# Patient Record
Sex: MALE | Race: WHITE | NOT HISPANIC OR LATINO
[De-identification: names, ages, dates, MRNs, and addresses within clinical notes are randomized per-mention and may not be internally consistent; named-entity substitution may affect disease eponyms.]

---

## 2021-06-21 PROBLEM — Z00.00 ENCOUNTER FOR PREVENTIVE HEALTH EXAMINATION: Status: ACTIVE | Noted: 2021-06-21

## 2021-06-25 ENCOUNTER — APPOINTMENT (OUTPATIENT)
Dept: NEUROLOGY | Facility: CLINIC | Age: 66
End: 2021-06-25
Payer: COMMERCIAL

## 2021-06-25 VITALS
DIASTOLIC BLOOD PRESSURE: 85 MMHG | OXYGEN SATURATION: 97 % | TEMPERATURE: 97.8 F | HEIGHT: 68 IN | BODY MASS INDEX: 26.52 KG/M2 | HEART RATE: 77 BPM | WEIGHT: 175 LBS | SYSTOLIC BLOOD PRESSURE: 132 MMHG

## 2021-06-25 PROCEDURE — 99204 OFFICE O/P NEW MOD 45 MIN: CPT

## 2021-06-25 PROCEDURE — 99072 ADDL SUPL MATRL&STAF TM PHE: CPT

## 2021-06-26 ENCOUNTER — TRANSCRIPTION ENCOUNTER (OUTPATIENT)
Age: 66
End: 2021-06-26

## 2021-06-26 NOTE — PHYSICAL EXAM
[FreeTextEntry1] : Neuro Exam\par MS: AAOx3, follows commands, good comprehension, fund of knowledge appears intact, no aphasia, no dysarthria\par CN:  PERRL, EOMI, peripheral vision intact, v1-v3 intact, hearing intact, no facial asymmetry, tongue & uvula midline, shoulder shrug equal strength\par Motor:  dec rom of left shoulder with 4/5 strength of shoulders b/l , otherwise 5/5 no drift, no rigidity, no abnormal atrophy\par Sensory: Lt/PP intact\par Coord: no tremors\par DTR: 1+\par \par

## 2021-06-26 NOTE — HISTORY OF PRESENT ILLNESS
[FreeTextEntry1] : pain, numbness in arms and shoulders. \par tingling in fingers. \par \par Pt is 64 yo M with hx of lumbar stenosis presenting with pain/numbness and weakness of UE\par \par Pt seen in the office\par \par Pt with hx of radiating arm pain and numbness for some time. More recently, pt noted some weakness down his left arm with subsequent difficulty with fine motor tasks (opening jars, playing guitar).  Pt reports that he is active, biking often, but denies any falls, head trauma.  Symptom was not acute in onset, slowly worsened over time.\par \par Otherwise, no Ha, no blurry vision, no nausea\par \par On exam today, pt with no isolated left arm weakness (pt reports that it comes and goes), with residual dec rom in left shoulder (old)\par \par

## 2021-06-26 NOTE — ASSESSMENT
[FreeTextEntry1] : \par - suggest MRI c spine to evaluate for any cervical stenosis as a cause of proximal UE weakness\par - will consider emg\par - follow up after MRI

## 2021-06-26 NOTE — REVIEW OF SYSTEMS
[Recent Weight Loss (___ Lbs)] : recent [unfilled] ~Ulb weight loss [Numbness] : numbness [Difficulty Walking] : difficulty walking [Joint Pain] : joint pain [Limb Pain] : limb pain [Negative] : Integumentary [FreeTextEntry4] : tinnitus [FreeTextEntry8] : nighttime urination [FreeTextEntry9] : muscle aches

## 2021-08-04 ENCOUNTER — APPOINTMENT (OUTPATIENT)
Dept: NEUROLOGY | Facility: CLINIC | Age: 66
End: 2021-08-04
Payer: COMMERCIAL

## 2021-08-04 PROCEDURE — 95913 NRV CNDJ TEST 13/> STUDIES: CPT

## 2021-08-04 PROCEDURE — 99072 ADDL SUPL MATRL&STAF TM PHE: CPT

## 2021-08-04 PROCEDURE — 95886 MUSC TEST DONE W/N TEST COMP: CPT

## 2021-09-10 ENCOUNTER — APPOINTMENT (OUTPATIENT)
Dept: NEUROLOGY | Facility: CLINIC | Age: 66
End: 2021-09-10
Payer: COMMERCIAL

## 2021-09-10 VITALS
TEMPERATURE: 97.9 F | WEIGHT: 175 LBS | SYSTOLIC BLOOD PRESSURE: 115 MMHG | OXYGEN SATURATION: 97 % | HEART RATE: 63 BPM | DIASTOLIC BLOOD PRESSURE: 70 MMHG | BODY MASS INDEX: 26.61 KG/M2

## 2021-09-10 DIAGNOSIS — M54.12 RADICULOPATHY, CERVICAL REGION: ICD-10-CM

## 2021-09-10 DIAGNOSIS — R29.898 OTHER SYMPTOMS AND SIGNS INVOLVING THE MUSCULOSKELETAL SYSTEM: ICD-10-CM

## 2021-09-10 DIAGNOSIS — M54.2 CERVICALGIA: ICD-10-CM

## 2021-09-10 PROCEDURE — 99215 OFFICE O/P EST HI 40 MIN: CPT

## 2021-09-10 PROCEDURE — 99072 ADDL SUPL MATRL&STAF TM PHE: CPT

## 2021-09-10 NOTE — ASSESSMENT
[FreeTextEntry1] : \par Pt with some component of cervical radiculopathy. no emg finding of peripheral neuropathy, no stroke/mass on mri head\par \par - physical therapy referral (cervical radiculopaty, frozen shoulder).\par - if symptom worsens suggested ortho/spine eval\par - no sig pain currently, prn pain meds. will defer any neuropathic pain meds\par - follow up with me as needed\par

## 2021-09-10 NOTE — DATA REVIEWED
[de-identified] : MRI brain with and without intervenous contrast, 8/3/2021\par \par TECHNIQUE: 1.5 Marie magnet. Localizer images. Sagittal T1-weighted. Axial\par T1-weighted, T2-weighted, susceptibility weighted and diffusion. Sagittal\par FLAIR volume acquisition sequence was reconstructed in coronal and axial\par planes. Coronal T2-weighted. Sagittal T1 weighted volume acquisition sequence\par after contrast was reconstructed in axial and coronal planes. A separate set\par of coronal T1-weighted images was obtained after contrast.\par \par INTRAVENOUS CONTRAST: 7 mL gadobutrol.\par \par CLINICAL INFORMATION: Bilateral shoulder weakness (R29.898)\par \par COMPARISON: MRI 8/3/2009\par \par IMAGING FINDINGS: No focal high or low signal intensity lesion is demonstrated\par in the brain. There is no abnormal enhancement of brain or meninges. There is\par no hydrocephalus. There is no extra-axial collection.\par \par No neoplastic replacement of bone marrow is demonstrated.\par \par No orbital abnormality is demonstrated.\par \par Minimal pansinus mucosal thickening is developed since the previous study.\par Mastoid air cells are unremarkable.\par \par IMPRESSION: Normal MRI study of the brain. No significant interval change.\par  [de-identified] : MRI c spine :  \par MRI cervical spine  without contrast\par \par COMPARISON: None\par \par TECHNIQUE: MRI cervical spine performed   1.5 Marie magnet\par \par FINDINGS:\par There is maintenance of the normal cervical lordosis. \par \par The prevertebral soft tissues appear within range of normal.\par \par The craniocervical junction and clivus and C1-C2 distance appear within range\par of normal\par \par There is uniform marrow signal on all sequences\par \par The cervical cord is uniform in signal intensity without evidence of syrinx or\par suggestion of myelomalacia.\par \par At C2-C3 ,  there is no significant central or foraminal stenosis.\par \par At C3-C4 there is disc desiccation, mild bilateral uncovertebral joint\par spurring, and a small right foraminal protrusion which results in moderate to\par severe right foraminal stenosis.\par \par At C4-[id number] is broad-based disc osteophyte complex and significant right\par greater than left uncovertebral joint spurring which results in severe right\par foraminal stenosis.\par \par At C5-C6 , there is no significant central or foraminal stenosis.\par \par At C6-C7 there is a tiny central disc protrusion superimposed on\par circumferential disc bulging. Bony productive changes contributes to moderate\par to severe bilateral foraminal stenosis.\par \par At C7-T1 , there is no significant central or foraminal stenosis.\par \par IMPRESSION:\par Small right foraminal disc protrusion at C3-C4 results in moderate to severe\par right foraminal stenosis\par \par Disc osteophyte complex at C4-C5 results in severe right foraminal stenosis\par \par Tiny central disc protrusion at C6-C7 and moderate to severe bilateral\par foraminal stenosis\par \par Other findings discussed above\par \par \par Thank you for allowing us to participate in the evaluation of this patient. Yes

## 2021-09-10 NOTE — HISTORY OF PRESENT ILLNESS
[FreeTextEntry1] : Pt is 66 yo RH here for follow up\par \par Pt with hx of subtle left hand numbness and weakness for several month, often worse with biking.  since last visit had had MRI head and EMG with no sig finding. MRI c spine with moderate to severe disk degenerative changes (more on right than left)\par \par Pt since then has limited biking, went to chiropractor, and is going well.

## 2022-12-16 ENCOUNTER — APPOINTMENT (OUTPATIENT)
Dept: INTERNAL MEDICINE | Facility: CLINIC | Age: 67
End: 2022-12-16
Payer: MEDICARE

## 2022-12-16 DIAGNOSIS — U07.1 COVID-19: ICD-10-CM

## 2022-12-16 PROCEDURE — 99213 OFFICE O/P EST LOW 20 MIN: CPT | Mod: 95

## 2022-12-16 RX ORDER — NIRMATRELVIR AND RITONAVIR 300-100 MG
20 X 150 MG & KIT ORAL
Qty: 1 | Refills: 0 | Status: ACTIVE | COMMUNITY
Start: 2022-12-16 | End: 1900-01-01